# Patient Record
Sex: FEMALE | Race: WHITE
[De-identification: names, ages, dates, MRNs, and addresses within clinical notes are randomized per-mention and may not be internally consistent; named-entity substitution may affect disease eponyms.]

---

## 2019-04-13 ENCOUNTER — HOSPITAL ENCOUNTER (EMERGENCY)
Dept: HOSPITAL 26 - MED | Age: 59
Discharge: HOME | End: 2019-04-13
Payer: COMMERCIAL

## 2019-04-13 VITALS — DIASTOLIC BLOOD PRESSURE: 78 MMHG | SYSTOLIC BLOOD PRESSURE: 114 MMHG

## 2019-04-13 VITALS — SYSTOLIC BLOOD PRESSURE: 105 MMHG | DIASTOLIC BLOOD PRESSURE: 75 MMHG

## 2019-04-13 VITALS — WEIGHT: 187 LBS | BODY MASS INDEX: 34.41 KG/M2 | HEIGHT: 62 IN
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
## 2019-04-13 DIAGNOSIS — E78.00: ICD-10-CM
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
## 2019-04-13 DIAGNOSIS — S52.121A: Primary | ICD-10-CM

## 2019-04-13 PROCEDURE — 99283 EMERGENCY DEPT VISIT LOW MDM: CPT

## 2019-04-13 PROCEDURE — 73080 X-RAY EXAM OF ELBOW: CPT

## 2019-04-13 PROCEDURE — 29105 APPLICATION LONG ARM SPLINT: CPT

## 2019-04-13 PROCEDURE — 96372 THER/PROPH/DIAG INJ SC/IM: CPT

## 2019-04-13 NOTE — NUR
Patient discharged with v/s stable. Written and verbal after care instructions 
given and explained. 

Patient alert, oriented and verbalized understanding of instructions. 
Ambulatory with steady gait. All questions addressed prior to discharge. ID 
band removed. Patient advised to follow up with PMD. Rx of TRAMADOL 50MG given. 
Patient educated on indication of medication including possible reaction and 
side effects. Opportunity to ask questions provided and answered.

## 2019-04-13 NOTE — NUR
PLACED A LONG ARM POSTERIOR SPLINT ON PT'S RIGHT ARM WITH HAND IN A NEUTRAL 
POSITION. SECURED SPLINT USING TWO ACE WRAPS AND FITTED THE PT WITH A SLING TO 
SUPPORT THE ARM.

## 2019-04-13 NOTE — NUR
C/O R ELBOW PAIN X3 DAYS AFTER CARRYING IN GROCERIES ON THAT ARM. DENIES ANY 
OTHER INJURY. VISIBLE SWELLING TO R ELBOW, NO REDNESS/BRUSING NOTED. ROM 
LIMITED, PT CAN MOVE FINGERS. DENIES NUMBNESS/TINGLING TO R HAND. SKIN IS 
PINK/WARM/DRY; AAOX4 WITH EVEN AND STEADY GAIT; LUNGS CLEAR BL; HR EVEN AND 
REGULAR;  VSS; PATIENT POSITIONED FOR COMFORT, GAVE A PILLOW TO REST R ARM ON; 
HOB ELEVATED; BEDRAILS UP X1; BED DOWN. ER MD MADE AWARE OF PT STATUS.

## 2021-04-23 ENCOUNTER — HOSPITAL ENCOUNTER (EMERGENCY)
Dept: HOSPITAL 26 - MED | Age: 61
Discharge: HOME | End: 2021-04-23
Payer: COMMERCIAL

## 2021-04-23 VITALS — DIASTOLIC BLOOD PRESSURE: 65 MMHG | SYSTOLIC BLOOD PRESSURE: 123 MMHG

## 2021-04-23 VITALS — SYSTOLIC BLOOD PRESSURE: 129 MMHG | DIASTOLIC BLOOD PRESSURE: 85 MMHG

## 2021-04-23 VITALS — HEIGHT: 66 IN | BODY MASS INDEX: 29.57 KG/M2 | WEIGHT: 184 LBS

## 2021-04-23 DIAGNOSIS — H16.002: Primary | ICD-10-CM

## 2021-04-23 NOTE — NUR
Patient to be transferred to [facility] ER.  Is being transferred due 
to HIGHER LEVEL OF CARE.  Receiving facility has accepting physician and 
available space, DR. HASTINGS. ER physician has signed transfer form.  Patient or 
responsible party has agreed to transfer and signed form.  Patient belongings 
inventoried and will be sent with patient.  Copy of nursing notes, lab reports, 
EKG, Physicians Orders and X-rays to be sent with patient.  Report called to 
TRICIA BRUMFIELD at receiving facility. [facility] ambulance service has been called for 
transfer.  ETA is [id number].

## 2021-04-23 NOTE — NUR
59 Y/O FEMALE C/O LEFT EYE SWELLING WITH CLEAR DISCHARGE WITH SOME BLURRY 
VISION [id number]. PT SAW PCP [id number] AGO AND WAS PRESCRIBED OFLOXACIN WITH 
MINIMAL RELIEF. PT STATES LEFT EYE PAIN 7/10 DESCRIBES AS BURNING 
NON-RADIATING. PT DENIES N/V, DENIES FEVER/CHILLS.



DENIES PMH



NKA